# Patient Record
Sex: FEMALE | Race: WHITE | NOT HISPANIC OR LATINO | Employment: FULL TIME | URBAN - METROPOLITAN AREA
[De-identification: names, ages, dates, MRNs, and addresses within clinical notes are randomized per-mention and may not be internally consistent; named-entity substitution may affect disease eponyms.]

---

## 2020-07-15 ENCOUNTER — OFFICE VISIT (OUTPATIENT)
Dept: URGENT CARE | Facility: CLINIC | Age: 25
End: 2020-07-15
Payer: OTHER GOVERNMENT

## 2020-07-15 VITALS
TEMPERATURE: 98 F | WEIGHT: 140 LBS | DIASTOLIC BLOOD PRESSURE: 81 MMHG | HEIGHT: 68 IN | HEART RATE: 79 BPM | BODY MASS INDEX: 21.22 KG/M2 | SYSTOLIC BLOOD PRESSURE: 124 MMHG | OXYGEN SATURATION: 100 % | RESPIRATION RATE: 16 BRPM

## 2020-07-15 DIAGNOSIS — M94.0 COSTOCHONDRITIS: ICD-10-CM

## 2020-07-15 DIAGNOSIS — B34.9 NONSPECIFIC SYNDROME SUGGESTIVE OF VIRAL ILLNESS: Primary | ICD-10-CM

## 2020-07-15 PROCEDURE — 99214 PR OFFICE/OUTPT VISIT, EST, LEVL IV, 30-39 MIN: ICD-10-PCS | Mod: TIER,S$GLB,, | Performed by: NURSE PRACTITIONER

## 2020-07-15 PROCEDURE — U0003 INFECTIOUS AGENT DETECTION BY NUCLEIC ACID (DNA OR RNA); SEVERE ACUTE RESPIRATORY SYNDROME CORONAVIRUS 2 (SARS-COV-2) (CORONAVIRUS DISEASE [COVID-19]), AMPLIFIED PROBE TECHNIQUE, MAKING USE OF HIGH THROUGHPUT TECHNOLOGIES AS DESCRIBED BY CMS-2020-01-R: HCPCS

## 2020-07-15 PROCEDURE — 99214 OFFICE O/P EST MOD 30 MIN: CPT | Mod: TIER,S$GLB,, | Performed by: NURSE PRACTITIONER

## 2020-07-15 RX ORDER — NAPROXEN SODIUM 550 MG/1
550 TABLET ORAL 2 TIMES DAILY WITH MEALS
Qty: 20 TABLET | Refills: 0 | Status: SHIPPED | OUTPATIENT
Start: 2020-07-15 | End: 2020-07-25

## 2020-07-15 RX ORDER — ALBUTEROL SULFATE 90 UG/1
2 AEROSOL, METERED RESPIRATORY (INHALATION) EVERY 6 HOURS PRN
Qty: 6.7 G | Refills: 0 | Status: SHIPPED | OUTPATIENT
Start: 2020-07-15

## 2020-07-15 NOTE — PROGRESS NOTES
"Subjective:       Patient ID: Fifi Gutiérrez is a 25 y.o. female.    Vitals:  height is 5' 8" (1.727 m) and weight is 63.5 kg (140 lb). Her temperature is 97.6 °F (36.4 °C). Her blood pressure is 124/81 and her pulse is 79. Her respiration is 16 and oxygen saturation is 100%.     Chief Complaint: Sinus Problem    Pt reports she is here to test for covid because her job is requiring she do so. C/o constant cough and chest tenderness, chills, and sore throat.     Sinus Problem  This is a new problem. Episode onset: 4 days ago. Maximum temperature: fever 99.8. Associated symptoms include chills, coughing, headaches (resolved), shortness of breath (resolved) and a sore throat. Pertinent negatives include no congestion, diaphoresis, ear pain, hoarse voice, neck pain, sneezing or swollen glands. (Shoulder pain) The treatment provided no relief.       Constitution: Positive for chills. Negative for sweating.   HENT: Positive for sore throat. Negative for ear pain and congestion.    Neck: Negative for neck pain.   Respiratory: Positive for cough, sputum production and shortness of breath (resolved).    Gastrointestinal: Positive for diarrhea.   Allergic/Immunologic: Negative for sneezing.   Neurological: Positive for headaches (resolved).       Objective:      Physical Exam   Constitutional: She is oriented to person, place, and time. She appears well-developed. She is cooperative.  Non-toxic appearance. She does not appear ill. No distress.   HENT:   Head: Normocephalic and atraumatic.   Ears:   Right Ear: Hearing, tympanic membrane, external ear and ear canal normal.   Left Ear: Hearing, tympanic membrane, external ear and ear canal normal.   Nose: Nose normal. No mucosal edema, rhinorrhea or nasal deformity. No epistaxis. Right sinus exhibits no maxillary sinus tenderness and no frontal sinus tenderness. Left sinus exhibits no maxillary sinus tenderness and no frontal sinus tenderness.   Mouth/Throat: Uvula is midline, " oropharynx is clear and moist and mucous membranes are normal. No trismus in the jaw. Normal dentition. No uvula swelling. No oropharyngeal exudate, posterior oropharyngeal edema or posterior oropharyngeal erythema.   Eyes: Conjunctivae and lids are normal. No scleral icterus.   Neck: Trachea normal, full passive range of motion without pain and phonation normal. Neck supple. No neck rigidity. No edema and no erythema present.   Cardiovascular: Normal rate, regular rhythm, normal heart sounds and normal pulses.   Pulmonary/Chest: Effort normal and breath sounds normal. No accessory muscle usage. No respiratory distress (Pt speaking in complete sentences without difficulty or distress.). She has no decreased breath sounds. She has no wheezes. She has no rhonchi.   Abdominal: Normal appearance.   Musculoskeletal: Normal range of motion.         General: No deformity.   Lymphadenopathy:     She has no cervical adenopathy.   Neurological: She is alert and oriented to person, place, and time. She exhibits normal muscle tone. Coordination normal.   Skin: Skin is warm, dry, intact, not diaphoretic and not pale. Psychiatric: Her speech is normal and behavior is normal. Judgment and thought content normal.   Nursing note and vitals reviewed.        Assessment:       1. Nonspecific syndrome suggestive of viral illness    2. Costochondritis        Plan:         Nonspecific syndrome suggestive of viral illness  -     COVID-19 Routine Screening  -     albuterol (PROVENTIL/VENTOLIN HFA) 90 mcg/actuation inhaler; Inhale 2 puffs into the lungs every 6 (six) hours as needed for Wheezing or Shortness of Breath. Rescue  Dispense: 6.7 g; Refill: 0    Costochondritis  -     naproxen sodium (ANAPROX) 550 MG tablet; Take 1 tablet (550 mg total) by mouth 2 (two) times daily with meals. for 10 days  Dispense: 20 tablet; Refill: 0          Patient Instructions     Instructions for Patients with Confirmed or Suspected COVID-19    If you are  awaiting your test result, you will either be called or it will be released to the patient portal.  If you have any questions about your test, please visit [email or web address] or call our COVID-19 information line at [phone].      Preventing the Spread of Coronavirus Disease 2019 (COVID-19) in Homes and Residential Communities -- Patients     Prevention steps for people with confirmed or suspected COVID-19 (including persons under investigation) who do not need to be hospitalized and people with confirmed COVID-19 who were hospitalized and determined to be medically stable to go home.      Stay home except to get medical care.    Separate yourself from other people and animals in your home.    Call ahead before visiting your doctor.    Wear a face mask.    Cover your coughs and sneezes.    Clean your hands often.    Avoid sharing personal household items.    Clean all high-touch surfaces every day.    Monitor your symptoms. Seek prompt medical attention if your illness is worsening (e.g., difficulty breathing). Before seeking care, call your healthcare provider.    If you have a medical emergency and must call 911, notify the dispatcher that you have or are being evaluated for COVID-19. If possible, put on a face mask before emergency medical services arrive.    Use the following symptom-based strategy to return to normal activity following a suspected or confirmed case of COVID-19. Continue isolation until:   o At least 3 days (72 hours) have passed since recovery defined as resolution of fever without the use of fever-reducing medications and improvement in respiratory symptoms (e.g. cough, shortness of breath), and   o At least 10 days have passed since symptoms first appeared.     Precautions for household members, intimate partners and caregivers in a non-healthcare setting of a patient with symptomatic laboratory-confirmed COVID-19 or a patient under investigation.     Household  members, intimate partners and caregivers in a non-healthcare setting may have close contact with a person with symptomatic, laboratory-confirmed COVID-19 or a person under investigation. Close contacts should monitor their health; they should call their healthcare provider right away if they develop symptoms suggestive of COVID-19 (e.g., fever, cough, shortness of breath). Close contacts should also follow these recommendations:     · Stay home for the duration of the time recommended by healthcare provider, except to get medical care. Separate yourself from other people and animals in the home.  · Monitor the patients symptoms. If the patient is getting sicker, call his or her healthcare provider. If the patient has a medical emergency and you need to call 911, notify the dispatch personnel that the patient has or is being evaluated for COVID-19.   · Wear a facemask when around other people such as sharing a room or vehicle and before entering a healthcare provider's office.  · Cover coughs and sneezes with a tissue. Throw used tissues in a lined trash can immediately and wash hands.  · Clean hands often with soap and water for at least 20 seconds or with an alcohol-based hand , rubbing hands together until they feel dry. Avoid touching your eyes, nose, and mouth with unwashed hands.  · Clean all high-touch; surfaces every day, including counters, tabletops, doorknobs, bathroom fixtures, toilets, phones, keyboards, tablets, bedside tables, etc. Use a household cleaning spray or wipe according to label instructions.  · Avoid sharing personal household items such as dishes, drinking glasses, cups, towels, bedding, etc. After these items are used, they should be washed thoroughly with soap and water.  · Use the following symptom-based strategy to return to normal activity following a suspected or confirmed case of COVID-19. Continue isolation until:   · At least 3 days (72 hours) have passed since  recovery defined as resolution of fever without the use of fever-reducing medications and improvement in respiratory symptoms (e.g. cough, shortness of breath), and   · At least 10 days have passed since symptoms first appeared.        Costochondritis    Costochondritis is inflammation of a rib or the cartilage that connects a rib to your breastbone (sternum). It causes tenderness, and sometimes chest pain may be sharp or aching, or it may feel like pressure. Pain may get worse with deep breathing, movement, or exercise. In some cases, the pain is mistaken for a heart attack. Despite this, the condition is not serious. Read on to learn more about the condition and how it can be treated.  What causes costochondritis?  The cause of costochondritis is not completely clear, but it may happen after a chest injury, chest infection, or coughing episode. Some physical activities can sometimes lead to costochondritis. Large-breasted women may be more likely to have the condition. Often, the reason for the inflammation is unknown.  Diagnosing costochondritis  There is no test for costochondritis. The condition is diagnosed by the symptoms you have. Your healthcare provider will perform a physical exam. He or she will ask you about your symptoms and examine your chest for tenderness. In some cases, tests are done to rule out more serious problems. These tests may include imaging tests such as chest X-ray, CT scan, or an ECG.  Treating costochondritis  If an underlying cause is found, treatment for that will likely relieve the problem. Costochondritis often goes away on its own. The course of the condition varies from person to person. It usually lasts from weeks to months. In some cases, mild symptoms continue for months to years. To ease symptoms:  · Take medicine as directed. These relieve pain and swelling. Ibuprofen or other NSAIDs are often recommended. In some cases, you may be given prescription medicine, such as muscle  relaxants.  · Avoid activities that put stress on the chest or spine.  · Apply a heating pad (set to warm, not too high, heat) to the breastbone several times a day.  · Perform stretching exercises as directed.  Call the healthcare provider right away if you have any of the following:  · Pain that is not relieved by medicine  · Shortness of breath  · Lightheadedness, dizziness, or fainting  · Feeling of irregular heartbeat or fast pulse  Anyone with chest pain should see a healthcare provider, especially those who are older and may be at risk for heart disease.   Date Last Reviewed: 10/1/2016  © 7678-8540 [employer]. [address] [place] [address], [place] [address]. All rights reserved. This information is not intended as a substitute for professional medical care. Always follow your healthcare professional's instructions.

## 2020-07-15 NOTE — PATIENT INSTRUCTIONS
Instructions for Patients with Confirmed or Suspected COVID-19    If you are awaiting your test result, you will either be called or it will be released to the patient portal.  If you have any questions about your test, please visit [email or web address] or call our COVID-19 information line at [phone].      Preventing the Spread of Coronavirus Disease 2019 (COVID-19) in Homes and Residential Communities -- Patients     Prevention steps for people with confirmed or suspected COVID-19 (including persons under investigation) who do not need to be hospitalized and people with confirmed COVID-19 who were hospitalized and determined to be medically stable to go home.      Stay home except to get medical care.    Separate yourself from other people and animals in your home.    Call ahead before visiting your doctor.    Wear a face mask.    Cover your coughs and sneezes.    Clean your hands often.    Avoid sharing personal household items.    Clean all high-touch surfaces every day.    Monitor your symptoms. Seek prompt medical attention if your illness is worsening (e.g., difficulty breathing). Before seeking care, call your healthcare provider.    If you have a medical emergency and must call 911, notify the dispatcher that you have or are being evaluated for COVID-19. If possible, put on a face mask before emergency medical services arrive.    Use the following symptom-based strategy to return to normal activity following a suspected or confirmed case of COVID-19. Continue isolation until:   o At least 3 days (72 hours) have passed since recovery defined as resolution of fever without the use of fever-reducing medications and improvement in respiratory symptoms (e.g. cough, shortness of breath), and   o At least 10 days have passed since symptoms first appeared.     Precautions for household members, intimate partners and caregivers in a non-healthcare setting of a patient with symptomatic  laboratory-confirmed COVID-19 or a patient under investigation.     Household members, intimate partners and caregivers in a non-healthcare setting may have close contact with a person with symptomatic, laboratory-confirmed COVID-19 or a person under investigation. Close contacts should monitor their health; they should call their healthcare provider right away if they develop symptoms suggestive of COVID-19 (e.g., fever, cough, shortness of breath). Close contacts should also follow these recommendations:     · Stay home for the duration of the time recommended by healthcare provider, except to get medical care. Separate yourself from other people and animals in the home.  · Monitor the patients symptoms. If the patient is getting sicker, call his or her healthcare provider. If the patient has a medical emergency and you need to call 911, notify the dispatch personnel that the patient has or is being evaluated for COVID-19.   · Wear a facemask when around other people such as sharing a room or vehicle and before entering a healthcare provider's office.  · Cover coughs and sneezes with a tissue. Throw used tissues in a lined trash can immediately and wash hands.  · Clean hands often with soap and water for at least 20 seconds or with an alcohol-based hand , rubbing hands together until they feel dry. Avoid touching your eyes, nose, and mouth with unwashed hands.  · Clean all high-touch; surfaces every day, including counters, tabletops, doorknobs, bathroom fixtures, toilets, phones, keyboards, tablets, bedside tables, etc. Use a household cleaning spray or wipe according to label instructions.  · Avoid sharing personal household items such as dishes, drinking glasses, cups, towels, bedding, etc. After these items are used, they should be washed thoroughly with soap and water.  · Use the following symptom-based strategy to return to normal activity following a suspected or confirmed case of COVID-19.  Continue isolation until:   · At least 3 days (72 hours) have passed since recovery defined as resolution of fever without the use of fever-reducing medications and improvement in respiratory symptoms (e.g. cough, shortness of breath), and   · At least 10 days have passed since symptoms first appeared.        Costochondritis    Costochondritis is inflammation of a rib or the cartilage that connects a rib to your breastbone (sternum). It causes tenderness, and sometimes chest pain may be sharp or aching, or it may feel like pressure. Pain may get worse with deep breathing, movement, or exercise. In some cases, the pain is mistaken for a heart attack. Despite this, the condition is not serious. Read on to learn more about the condition and how it can be treated.  What causes costochondritis?  The cause of costochondritis is not completely clear, but it may happen after a chest injury, chest infection, or coughing episode. Some physical activities can sometimes lead to costochondritis. Large-breasted women may be more likely to have the condition. Often, the reason for the inflammation is unknown.  Diagnosing costochondritis  There is no test for costochondritis. The condition is diagnosed by the symptoms you have. Your healthcare provider will perform a physical exam. He or she will ask you about your symptoms and examine your chest for tenderness. In some cases, tests are done to rule out more serious problems. These tests may include imaging tests such as chest X-ray, CT scan, or an ECG.  Treating costochondritis  If an underlying cause is found, treatment for that will likely relieve the problem. Costochondritis often goes away on its own. The course of the condition varies from person to person. It usually lasts from weeks to months. In some cases, mild symptoms continue for months to years. To ease symptoms:  · Take medicine as directed. These relieve pain and swelling. Ibuprofen or other NSAIDs are often  recommended. In some cases, you may be given prescription medicine, such as muscle relaxants.  · Avoid activities that put stress on the chest or spine.  · Apply a heating pad (set to warm, not too high, heat) to the breastbone several times a day.  · Perform stretching exercises as directed.  Call the healthcare provider right away if you have any of the following:  · Pain that is not relieved by medicine  · Shortness of breath  · Lightheadedness, dizziness, or fainting  · Feeling of irregular heartbeat or fast pulse  Anyone with chest pain should see a healthcare provider, especially those who are older and may be at risk for heart disease.   Date Last Reviewed: 10/1/2016  © 6784-2506 [employer]. [address] [place] [address], [place] [address]. All rights reserved. This information is not intended as a substitute for professional medical care. Always follow your healthcare professional's instructions.

## 2020-07-15 NOTE — LETTER
[address] [facility], [facility] ? [place], [address] ? Phone [phone] ? Fax [phone]           Return to Work/School    Patient: Fifi Gutiérrez  YOB: 1995   Date: 07/15/2020      To Whom It May Concern:     Fifi Gutiérrez was in contact with/seen in my office on 07/15/2020. COVID-19 is present in our communities across the state. Not all patients are eligible or appropriate to be tested. In this situation, your employee meets the following criteria:     Fifi Gutiérrez has met the criteria for COVID-19 testing based upon symptoms, travel, and/or potential exposure. The test has been completed and is pending results at this time. During this time the employee is not able to work and should be quarantined per the Centers for Disease Control timelines.      If you have any questions or concerns, or if I can be of further assistance, please do not hesitate to contact me.     Sincerely,      Reva Schmidt NP

## 2020-07-16 LAB — SARS-COV-2 RNA RESP QL NAA+PROBE: NOT DETECTED

## 2021-05-10 ENCOUNTER — OFFICE VISIT (OUTPATIENT)
Dept: OBSTETRICS AND GYNECOLOGY | Facility: CLINIC | Age: 26
End: 2021-05-10

## 2021-05-10 VITALS
BODY MASS INDEX: 21.37 KG/M2 | DIASTOLIC BLOOD PRESSURE: 68 MMHG | HEIGHT: 68 IN | WEIGHT: 141 LBS | SYSTOLIC BLOOD PRESSURE: 114 MMHG

## 2021-05-10 DIAGNOSIS — Z00.00 ENCOUNTER FOR WELLNESS EXAMINATION: Primary | ICD-10-CM

## 2021-05-10 DIAGNOSIS — R30.0 DYSURIA: ICD-10-CM

## 2021-05-10 LAB
BILIRUB SERPL-MCNC: NEGATIVE MG/DL
BLOOD URINE, POC: NEGATIVE
CLARITY, POC UA: NORMAL
COLOR, POC UA: YELLOW
GLUCOSE UR QL STRIP: NEGATIVE
KETONES UR QL STRIP: NEGATIVE
LEUKOCYTE ESTERASE URINE, POC: NEGATIVE
NITRITE, POC UA: POSITIVE
PH, POC UA: 6.5
PROTEIN, POC: NEGATIVE
SPECIFIC GRAVITY, POC UA: 1
UROBILINOGEN, POC UA: NEGATIVE

## 2021-05-10 PROCEDURE — 88175 CYTOPATH C/V AUTO FLUID REDO: CPT | Performed by: OBSTETRICS & GYNECOLOGY

## 2021-05-10 PROCEDURE — 81002 POCT URINE DIPSTICK WITHOUT MICROSCOPE: ICD-10-PCS | Mod: S$GLB,,, | Performed by: OBSTETRICS & GYNECOLOGY

## 2021-05-10 PROCEDURE — 81002 URINALYSIS NONAUTO W/O SCOPE: CPT | Mod: S$GLB,,, | Performed by: OBSTETRICS & GYNECOLOGY

## 2021-05-10 PROCEDURE — 99395 PR PREVENTIVE VISIT,EST,18-39: ICD-10-PCS | Mod: 25,S$GLB,, | Performed by: OBSTETRICS & GYNECOLOGY

## 2021-05-10 PROCEDURE — 99395 PREV VISIT EST AGE 18-39: CPT | Mod: 25,S$GLB,, | Performed by: OBSTETRICS & GYNECOLOGY

## 2022-09-23 ENCOUNTER — OFFICE VISIT (OUTPATIENT)
Dept: OBSTETRICS AND GYNECOLOGY | Facility: CLINIC | Age: 27
End: 2022-09-23

## 2022-09-23 VITALS
DIASTOLIC BLOOD PRESSURE: 73 MMHG | HEIGHT: 68 IN | BODY MASS INDEX: 20.92 KG/M2 | WEIGHT: 138 LBS | SYSTOLIC BLOOD PRESSURE: 118 MMHG

## 2022-09-23 DIAGNOSIS — Z01.419 WELL WOMAN EXAM: Primary | ICD-10-CM

## 2022-09-23 DIAGNOSIS — Z11.3 SCREENING EXAMINATION FOR STD (SEXUALLY TRANSMITTED DISEASE): ICD-10-CM

## 2022-09-23 PROCEDURE — 99395 PREV VISIT EST AGE 18-39: CPT | Mod: S$GLB,,, | Performed by: NURSE PRACTITIONER

## 2022-09-23 PROCEDURE — 81514 NFCT DS BV&VAGINITIS DNA ALG: CPT | Performed by: NURSE PRACTITIONER

## 2022-09-23 PROCEDURE — 87491 CHLMYD TRACH DNA AMP PROBE: CPT | Performed by: NURSE PRACTITIONER

## 2022-09-23 PROCEDURE — 87591 N.GONORRHOEAE DNA AMP PROB: CPT | Performed by: NURSE PRACTITIONER

## 2022-09-23 PROCEDURE — 99395 PR PREVENTIVE VISIT,EST,18-39: ICD-10-PCS | Mod: S$GLB,,, | Performed by: NURSE PRACTITIONER

## 2022-09-23 NOTE — PROGRESS NOTES
"CC: Well woman exam    Fifi Gutiérrez is a 27 y.o. female  presents for well woman exam.  LMP: Patient's last menstrual period was 2022..   Patients last pap was 2021.  Last wellness labs were done 2018.   Birth control none.  SBE yes. PCP-none.  Reports some intermittent low abdominal pain around cycle for the past 2-3 months.        Chief Complaint   Patient presents with    Well Woman     Patient is here for annual exam.        Past Medical History:   Diagnosis Date    Hx of chlamydia infection      History reviewed. No pertinent surgical history.  Social History     Socioeconomic History    Marital status: Single   Tobacco Use    Smoking status: Every Day     Types: Cigarettes    Smokeless tobacco: Never   Substance and Sexual Activity    Alcohol use: Yes     Comment: soc    Drug use: Never    Sexual activity: Yes     Birth control/protection: None     Family History   Problem Relation Age of Onset    Hypertension Mother     Chronic back pain Mother     Hypertension Father     Colon cancer Paternal Grandfather     Hiatal hernia Maternal Grandfather      OB History          1    Para   1    Term   1            AB        Living             SAB        IAB        Ectopic        Multiple        Live Births                     /73   Ht 5' 8" (1.727 m)   Wt 62.6 kg (138 lb)   LMP 2022   BMI 20.98 kg/m²       ROS:  GENERAL: Denies weight gain or weight loss. Feeling well overall.   SKIN: Denies rash or lesions.   HEAD: Denies head injury or headache.   NODES: Denies enlarged lymph nodes.   CHEST: Denies chest pain or shortness of breath.   CARDIOVASCULAR: Denies palpitations or left sided chest pain.   ABDOMEN: No abdominal pain, constipation, diarrhea, nausea, vomiting or rectal bleeding.   URINARY: No frequency, dysuria, hematuria, or burning on urination.  REPRODUCTIVE: See HPI.   BREASTS: The patient performs breast self-examination and denies pain, lumps, or nipple " discharge.   HEMATOLOGIC: No easy bruisability or excessive bleeding.   MUSCULOSKELETAL: Denies joint pain or swelling.   NEUROLOGIC: Denies syncope or weakness.   PSYCHIATRIC: Denies depression, anxiety or mood swings.    PHYSICAL EXAM:  APPEARANCE: Well nourished, well developed, in no acute distress.  AFFECT: WNL, alert and oriented x 3  SKIN: No acne or hirsutism  NECK: Neck symmetric without masses or thyromegaly  NODES: No inguinal, cervical, or axillary lymph node enlargement  CHEST: Good respiratory effect  ABDOMEN: Soft.  No tenderness or masses.  No hepatosplenomegaly.  No hernias.  BREASTS: Symmetrical, no skin changes or visible lesions.  No palpable masses, nipple discharge bilaterally.  PELVIC: Normal external genitalia without lesions.  Normal urethral meatus.  Vagina well rugated without lesions or discharge.  Cervix pink, without lesions, discharge or tenderness.  No significant cystocele or rectocele.  Bimanual exam shows uterus to be normal size, regular, mobile and nontender.  Adnexa without masses or tenderness.    EXTREMITIES: No edema.    Well woman exam  -     C. trachomatis/N. gonorrhoeae by AMP DNA [facility]; Cervicovaginal  -     Vaginosis Screen by DNA Probe    Screening examination for STD (sexually transmitted disease)  -     C. trachomatis/N. gonorrhoeae by AMP DNA [facility]; Cervicovaginal  -     Vaginosis Screen by DNA Probe          Patient was counseled today on A.C.S. Pap guidelines and recommendations for yearly pelvic exams, mammograms and monthly self breast exams; to see her PCP for other health maintenance. STD testing done today.  If abdominal pain continues discussed having ultrasound done to check for abnormalities.  RTC for annual in 1 year or before for any GYN problems/concerns.     No follow-ups on file.    Parisa Schafer, SULTANA-C

## 2022-09-26 LAB
C TRACH DNA SPEC QL NAA+PROBE: NOT DETECTED
N GONORRHOEA DNA SPEC QL NAA+PROBE: NOT DETECTED

## 2022-09-27 LAB
BACTERIAL VAGINOSIS DNA: NEGATIVE
CANDIDA GLABRATA DNA: NEGATIVE
CANDIDA KRUSEI DNA: NEGATIVE
CANDIDA RRNA VAG QL PROBE: NEGATIVE
T VAGINALIS RRNA GENITAL QL PROBE: NEGATIVE